# Patient Record
Sex: FEMALE | Race: WHITE | NOT HISPANIC OR LATINO
[De-identification: names, ages, dates, MRNs, and addresses within clinical notes are randomized per-mention and may not be internally consistent; named-entity substitution may affect disease eponyms.]

---

## 2017-09-12 PROBLEM — Z00.00 ENCOUNTER FOR PREVENTIVE HEALTH EXAMINATION: Status: ACTIVE | Noted: 2017-09-12

## 2017-09-25 ENCOUNTER — APPOINTMENT (OUTPATIENT)
Dept: VASCULAR SURGERY | Facility: CLINIC | Age: 82
End: 2017-09-25
Payer: MEDICARE

## 2017-09-25 VITALS — HEIGHT: 65 IN

## 2017-09-25 DIAGNOSIS — Z85.3 PERSONAL HISTORY OF MALIGNANT NEOPLASM OF BREAST: ICD-10-CM

## 2017-09-25 DIAGNOSIS — Z86.39 PERSONAL HISTORY OF OTHER ENDOCRINE, NUTRITIONAL AND METABOLIC DISEASE: ICD-10-CM

## 2017-09-25 DIAGNOSIS — J44.9 CHRONIC OBSTRUCTIVE PULMONARY DISEASE, UNSPECIFIED: ICD-10-CM

## 2017-09-25 DIAGNOSIS — Z82.61 FAMILY HISTORY OF ARTHRITIS: ICD-10-CM

## 2017-09-25 DIAGNOSIS — Z78.9 OTHER SPECIFIED HEALTH STATUS: ICD-10-CM

## 2017-09-25 DIAGNOSIS — Z87.39 PERSONAL HISTORY OF OTHER DISEASES OF THE MUSCULOSKELETAL SYSTEM AND CONNECTIVE TISSUE: ICD-10-CM

## 2017-09-25 DIAGNOSIS — Z72.3 LACK OF PHYSICAL EXERCISE: ICD-10-CM

## 2017-09-25 DIAGNOSIS — Z87.891 PERSONAL HISTORY OF NICOTINE DEPENDENCE: ICD-10-CM

## 2017-09-25 DIAGNOSIS — Z80.9 FAMILY HISTORY OF MALIGNANT NEOPLASM, UNSPECIFIED: ICD-10-CM

## 2017-09-25 DIAGNOSIS — M79.669 PAIN IN UNSPECIFIED LOWER LEG: ICD-10-CM

## 2017-09-25 DIAGNOSIS — Z87.09 PERSONAL HISTORY OF OTHER DISEASES OF THE RESPIRATORY SYSTEM: ICD-10-CM

## 2017-09-25 PROCEDURE — 93970 EXTREMITY STUDY: CPT

## 2017-09-25 PROCEDURE — 99204 OFFICE O/P NEW MOD 45 MIN: CPT

## 2018-12-20 ENCOUNTER — APPOINTMENT (OUTPATIENT)
Dept: PULMONOLOGY | Facility: CLINIC | Age: 83
End: 2018-12-20

## 2018-12-21 ENCOUNTER — APPOINTMENT (OUTPATIENT)
Dept: NEUROLOGY | Facility: CLINIC | Age: 83
End: 2018-12-21
Payer: MEDICARE

## 2018-12-21 DIAGNOSIS — M54.9 DORSALGIA, UNSPECIFIED: ICD-10-CM

## 2018-12-21 PROCEDURE — 99204 OFFICE O/P NEW MOD 45 MIN: CPT

## 2018-12-21 RX ORDER — PAROXETINE HYDROCHLORIDE 20 MG/1
20 TABLET, FILM COATED ORAL
Refills: 0 | Status: DISCONTINUED | COMMUNITY
End: 2018-12-21

## 2018-12-21 RX ORDER — LEVOTHYROXINE SODIUM 112 UG/1
112 TABLET ORAL
Refills: 0 | Status: DISCONTINUED | COMMUNITY
End: 2018-12-21

## 2018-12-21 RX ORDER — FLUTICASONE FUROATE 200 UG/1
200 POWDER RESPIRATORY (INHALATION)
Refills: 0 | Status: DISCONTINUED | COMMUNITY
End: 2018-12-21

## 2018-12-21 RX ORDER — PAROXETINE HYDROCHLORIDE 20 MG/1
20 TABLET, FILM COATED ORAL 3 TIMES DAILY
Refills: 0 | Status: ACTIVE | COMMUNITY

## 2018-12-21 RX ORDER — HYDROCODONE BITARTRATE AND ACETAMINOPHEN 7.5; 325 MG/1; MG/1
7.5-325 TABLET ORAL DAILY
Refills: 0 | Status: ACTIVE | COMMUNITY

## 2018-12-21 RX ORDER — PANTOPRAZOLE 40 MG/1
40 TABLET, DELAYED RELEASE ORAL
Refills: 0 | Status: DISCONTINUED | COMMUNITY
End: 2018-12-21

## 2018-12-21 RX ORDER — FLUTICASONE PROPIONATE 50 UG/1
50 SPRAY, METERED NASAL TWICE DAILY
Refills: 0 | Status: ACTIVE | COMMUNITY

## 2018-12-21 RX ORDER — PANTOPRAZOLE 40 MG/1
40 TABLET, DELAYED RELEASE ORAL DAILY
Refills: 0 | Status: ACTIVE | COMMUNITY

## 2018-12-21 RX ORDER — FLUTICASONE FUROATE AND VILANTEROL TRIFENATATE 50; 25 UG/1; UG/1
POWDER RESPIRATORY (INHALATION)
Refills: 0 | Status: ACTIVE | COMMUNITY

## 2018-12-21 RX ORDER — PREDNISONE 50 MG/1
TABLET ORAL
Refills: 0 | Status: ACTIVE | COMMUNITY

## 2018-12-21 RX ORDER — GABAPENTIN 600 MG/1
600 TABLET, COATED ORAL 3 TIMES DAILY
Refills: 0 | Status: ACTIVE | COMMUNITY

## 2018-12-21 RX ORDER — ACETAMINOPHEN 325 MG/1
325 TABLET ORAL
Refills: 0 | Status: DISCONTINUED | COMMUNITY
End: 2018-12-21

## 2018-12-21 RX ORDER — LEVOTHYROXINE SODIUM 100 UG/1
100 TABLET ORAL DAILY
Refills: 0 | Status: ACTIVE | COMMUNITY

## 2018-12-24 ENCOUNTER — APPOINTMENT (OUTPATIENT)
Dept: PULMONOLOGY | Facility: CLINIC | Age: 83
End: 2018-12-24

## 2018-12-24 VITALS — DIASTOLIC BLOOD PRESSURE: 68 MMHG | HEART RATE: 72 BPM | SYSTOLIC BLOOD PRESSURE: 124 MMHG | TEMPERATURE: 97.9 F

## 2018-12-24 VITALS — WEIGHT: 212 LBS | HEIGHT: 65 IN | BODY MASS INDEX: 35.32 KG/M2

## 2019-01-30 ENCOUNTER — RESULT REVIEW (OUTPATIENT)
Age: 84
End: 2019-01-30

## 2019-02-06 ENCOUNTER — APPOINTMENT (OUTPATIENT)
Dept: NEUROLOGY | Facility: CLINIC | Age: 84
End: 2019-02-06
Payer: MEDICARE

## 2019-02-06 VITALS
HEART RATE: 82 BPM | BODY MASS INDEX: 35.32 KG/M2 | HEIGHT: 65 IN | TEMPERATURE: 96.7 F | SYSTOLIC BLOOD PRESSURE: 146 MMHG | DIASTOLIC BLOOD PRESSURE: 80 MMHG | WEIGHT: 212 LBS

## 2019-02-06 PROCEDURE — 99214 OFFICE O/P EST MOD 30 MIN: CPT

## 2019-02-06 RX ORDER — PREGABALIN 100 MG/1
100 CAPSULE ORAL
Qty: 180 | Refills: 5 | Status: ACTIVE | COMMUNITY
Start: 2019-02-06 | End: 1900-01-01

## 2019-02-07 NOTE — PHYSICAL EXAM
[FreeTextEntry1] : Physical examination \par General: No acute distress, Awake, Alert.   \par \par Mental status \par Awake, alert, appropriate.  Gives detailed history.\par \par Cranial Nerves \par III, IV, VI: PERRL, EOMI - exotropia   \par VII: Facial strength is normal B/L. \par VIII:Decreased hearing, bilaterally. \par \par \par Motor exam  \par Muscle tone - no evidence of rigidity or resistance in all 4 extremities.  \par No atrophy or fasciculations \par Muscle Strength: arms and legs, proximal and distal flexors and extensors are normal except for left EHL 4/5.\par \par Reflexes \par All present, normal, and symmetrical.   \par Plantars right: mute.   \par Plantars left: mute.   \par \par Ankle reflexes 1+\par \par \par Sensory \par Reduced distal sensation to cold to half way up the lower legs.\par decreased vibration to the right knee and left great toe.\par \par \par Gait \par Slow, wide based gait. \par \par

## 2019-02-07 NOTE — DATA REVIEWED
[de-identified] : May 2018 \par Hemoglobin A1c-5.8%\par Immunofixation electrophoresis-no monoclonal banding\par ESR 12\par September 2018\par TSH normal\par

## 2019-02-07 NOTE — ASSESSMENT
[FreeTextEntry1] : Ms. Tamez is an 85-year-old woman with a painful distal symmetric polyneuropathy. Serological workup to identify any underlying cause of the neuropathy has been performed. \par Her pain is not well controlled on the current medication, therefore, I recommend tapering off the gabapentin and giving a trial of Lyrica.\par Gabapentin 600 mg 3 times a day for 2 days then 300 mg 3 times a day for 2 days then 300 mg twice a day for 2 days then stop.\par Start Lyrica when you are taking gabapentin 300 mg twice a day.\par 100 mg twice a day for one week then\par 200 mg twice a day for one week then\par 300 mg twice a day.\par Watch for increase in pain while tapering off the gabapentin. \par I do not recommend performing an EMG nerve conduction studies because the results of the nerve conduction studies will be suboptimal due to technical factors related to body habitus and lower extremity edema.

## 2019-02-07 NOTE — REVIEW OF SYSTEMS
[Negative] : Heme/Lymph [Feeling Poorly] : feeling poorly [Feeling Tired] : feeling tired [Recent Weight Gain (___ Lbs)] : recent [unfilled] ~Ulb weight gain [Leg Weakness] : leg weakness [Numbness] : numbness [Tingling] : tingling [Dizziness] : dizziness [Lightheadedness] : lightheadedness [Difficulty Walking] : difficulty walking [Anxiety] : anxiety [Depression] : depression [Nasal Discharge] : nasal discharge [Sore Throat] : sore throat [Hoarseness] : hoarseness [Lower Ext Edema] : lower extremity edema [Shortness Of Breath] : shortness of breath [Cough] : cough [SOB on Exertion] : shortness of breath during exertion [Heartburn] : heartburn [Joint Pain] : joint pain [Joint Stiffness] : joint stiffness [Limb Pain] : limb pain [Limb Swelling] : limb swelling [Breast Lump] : breast lump [Hot Flashes] : hot flashes [Muscle Weakness] : muscle weakness [FreeTextEntry2] : hypertension [de-identified] : neuropathy,pain in the legs and feet [FreeTextEntry9] : back pain [de-identified] : Temperature intolerance

## 2019-02-07 NOTE — REASON FOR VISIT
[Procedure: _________] : a [unfilled] procedure visit [FreeTextEntry1] : Pain in the legs and feet,does not feel better since last visit

## 2019-02-25 ENCOUNTER — RX CHANGE (OUTPATIENT)
Age: 84
End: 2019-02-25

## 2019-02-27 ENCOUNTER — APPOINTMENT (OUTPATIENT)
Dept: PAIN MANAGEMENT | Facility: CLINIC | Age: 84
End: 2019-02-27

## 2019-02-27 DIAGNOSIS — I10 ESSENTIAL (PRIMARY) HYPERTENSION: ICD-10-CM

## 2019-02-27 RX ORDER — LEVOTHYROXINE SODIUM 0.07 MG/1
75 TABLET ORAL
Refills: 0 | Status: ACTIVE | COMMUNITY

## 2019-02-27 RX ORDER — BUDESONIDE AND FORMOTEROL FUMARATE DIHYDRATE 160; 4.5 UG/1; UG/1
160-4.5 AEROSOL RESPIRATORY (INHALATION)
Refills: 0 | Status: ACTIVE | COMMUNITY

## 2019-02-27 RX ORDER — AZELASTINE HYDROCHLORIDE 137 UG/1
0.1 SPRAY, METERED NASAL
Refills: 0 | Status: ACTIVE | COMMUNITY

## 2019-02-27 RX ORDER — LOSARTAN POTASSIUM AND HYDROCHLOROTHIAZIDE 12.5; 5 MG/1; MG/1
50-12.5 TABLET ORAL
Refills: 0 | Status: ACTIVE | COMMUNITY

## 2019-02-27 RX ORDER — PAROXETINE HYDROCHLORIDE 20 MG/1
20 TABLET, FILM COATED ORAL
Refills: 0 | Status: ACTIVE | COMMUNITY

## 2019-02-27 RX ORDER — TIOTROPIUM BROMIDE 18 UG/1
18 CAPSULE ORAL; RESPIRATORY (INHALATION)
Refills: 0 | Status: ACTIVE | COMMUNITY

## 2019-03-08 ENCOUNTER — RECORD ABSTRACTING (OUTPATIENT)
Age: 84
End: 2019-03-08

## 2019-03-18 ENCOUNTER — APPOINTMENT (OUTPATIENT)
Dept: NEUROLOGY | Facility: CLINIC | Age: 84
End: 2019-03-18
Payer: MEDICARE

## 2019-03-18 ENCOUNTER — APPOINTMENT (OUTPATIENT)
Dept: PAIN MANAGEMENT | Facility: CLINIC | Age: 84
End: 2019-03-18
Payer: MEDICARE

## 2019-03-18 VITALS
SYSTOLIC BLOOD PRESSURE: 136 MMHG | WEIGHT: 212 LBS | HEIGHT: 65 IN | BODY MASS INDEX: 35.32 KG/M2 | DIASTOLIC BLOOD PRESSURE: 80 MMHG

## 2019-03-18 VITALS
DIASTOLIC BLOOD PRESSURE: 75 MMHG | HEIGHT: 65 IN | SYSTOLIC BLOOD PRESSURE: 137 MMHG | TEMPERATURE: 97 F | HEART RATE: 85 BPM | BODY MASS INDEX: 35.32 KG/M2 | WEIGHT: 212 LBS

## 2019-03-18 DIAGNOSIS — M54.16 RADICULOPATHY, LUMBAR REGION: ICD-10-CM

## 2019-03-18 DIAGNOSIS — M47.817 SPONDYLOSIS W/OUT MYELOPATHY OR RADICULOPATHY, LUMBOSACRAL REGION: ICD-10-CM

## 2019-03-18 DIAGNOSIS — G60.8 OTHER HEREDITARY AND IDIOPATHIC NEUROPATHIES: ICD-10-CM

## 2019-03-18 PROCEDURE — 99214 OFFICE O/P EST MOD 30 MIN: CPT

## 2019-03-18 RX ORDER — GABAPENTIN 600 MG/1
600 TABLET, COATED ORAL 3 TIMES DAILY
Qty: 180 | Refills: 5 | Status: ACTIVE | COMMUNITY
Start: 2019-03-18 | End: 1900-01-01

## 2019-03-18 RX ORDER — DULOXETINE HYDROCHLORIDE 60 MG/1
60 CAPSULE, DELAYED RELEASE PELLETS ORAL DAILY
Qty: 30 | Refills: 5 | Status: ACTIVE | COMMUNITY
Start: 2019-03-18 | End: 1900-01-01

## 2019-03-18 NOTE — PHYSICAL EXAM
[de-identified] : Constitutional: Well-developed, in no acute distress\par Eyes: Pupil- Right: normal, Left: normal\par Neck exam: Inspection normal\par Respiratory: Normal effort, speaking in full sentences\par Cardiovascular: Regular rate and rhythm\par Vascular: Dorsal pedis pulses normal and equal bilaterally\par Abdomen: Inspection normal, no distension\par Skin: Inspection normal, no bruising noted\par Musculoskeletal:\par Lumbar Spine:   Gait: Antalgic\par 		Inspection: Normal curvature, no abnormal kyphosis or scoliosis\par 		Facet loading: pain bilaterally\par 		Palpation:\par 			Lumbar and paraspinal muscles: pain bilaterally\par 			Sacroiliac joint: no pain\par 			Greater trochanter: no pain\par 		Muscle Strength:\par 		Iliopsoas: 5/5 bilaterally\par 		Quadriceps: 5/5 bilaterally\par 		Hamstrings: 5/5 bilaterally\par 		Tibialis anterior: 5/5 bilaterally\par 		Extensor hallucis longus: 5/5 bilaterally\par \par 		Sensation: normal and equal in bilateral lower extremities\par \par 		Reflexes:\par 			Patellar reflex: 2+ bilaterally\par 			Ankle jerk reflex: 2+ bilaterally\par 		\par 		Straight leg raise: negative bilaterally\par \par Extremity: no edema noted\par Neurological: Memory normal, AAO x 3, Cranial nerves II - XII grossly normal\par Psychiatric: Appropriate mood and affect, oriented to time, place, person, and situation

## 2019-03-18 NOTE — ASSESSMENT
[FreeTextEntry1] : 85-year-old female presents with long-standing low back pain that radiates down the bilateral lower extremities. She has had no relief with physical therapy, chiropractic care, or medication management. Burning in her legs has worsened. She has not yet had relief with Cymbalta. Gabapentin does help with her pain. She reports she is not interested in any interventions for her pain. I do believe the patient would likely benefit from an epidural steroid injection, she will consider this in the future. The patient advised please followup at any time she desires further evaluation.

## 2019-03-18 NOTE — HISTORY OF PRESENT ILLNESS
[FreeTextEntry1] : F/U for Ms. Tamez who is an 85-year-old woman with a painful distal symmetric polyneuropathy.  \par Her pain worsened when tapering off the gabapentin. The pain improved once again after restarting gabapentin. She is currently on 900 mg t.i.d. with no side effects. She still has some neuropathic pain which has improved.  She still has episodes in which her feet get red and she has a severe painful burning sensation in the feet. She has lower extremity edema.  She has significant skin changes in the legs. The neuropathic pain is worse in the evening.\par Lyrica did not help the pain.\par She has been taking duloxetine 30 mg daily for the past few weeks with no clear change in pain.  No side effects.\par \par She has had chronic low back pain for decades

## 2019-03-18 NOTE — DATA REVIEWED
[de-identified] : Vitamin B12 - 667\par \par May 2018 \par Hemoglobin A1c-5.8%\par Immunofixation electrophoresis-no monoclonal banding\par ESR 12\par September 2018\par TSH normal\par

## 2019-03-18 NOTE — REVIEW OF SYSTEMS
[Feeling Poorly] : feeling poorly [Feeling Tired] : feeling tired [Recent Weight Gain (___ Lbs)] : recent [unfilled] ~Ulb weight gain [Numbness] : numbness [Leg Weakness] : leg weakness [Dizziness] : dizziness [Tingling] : tingling [Lightheadedness] : lightheadedness [Anxiety] : anxiety [Difficulty Walking] : difficulty walking [Depression] : depression [Nasal Discharge] : nasal discharge [Sore Throat] : sore throat [Hoarseness] : hoarseness [Lower Ext Edema] : lower extremity edema [Shortness Of Breath] : shortness of breath [Cough] : cough [Heartburn] : heartburn [SOB on Exertion] : shortness of breath during exertion [Joint Stiffness] : joint stiffness [Joint Pain] : joint pain [Limb Pain] : limb pain [Limb Swelling] : limb swelling [Breast Lump] : breast lump [Hot Flashes] : hot flashes [Muscle Weakness] : muscle weakness [Negative] : Heme/Lymph [FreeTextEntry2] : hypertension [de-identified] : neuropathy,pain in the legs and feet [FreeTextEntry9] : back pain [de-identified] : Temperature intolerance

## 2019-03-18 NOTE — CONSULT LETTER
[Dear  ___] : Dear  [unfilled], [FreeTextEntry1] : I had the pleasure of evaluating your patient, PAUL DAVIS. Please see the assessment section below for a summery of my diagnostic impression and plan.\par \par Thank you very much for allowing me to participate in the care of this patient. If you have any questions, please do not hesitate to contact me. \par \par Sincerely,\par \par Stephanie Wang MD\par

## 2019-03-18 NOTE — ASSESSMENT
[FreeTextEntry1] : Ms. Tamez is an 85-year-old woman with a painful distal symmetric polyneuropathy. Serological workup to identify any underlying cause of the neuropathy has been performed. \par Mild to moderate lumbar spinal stenosis - I reviewed MRI LS spine from several years ago. \par Her pain worsened after tapering gabapentin and improved after restarting it.  Continue to increase dose from 900 to 1200 mg TID.\par Lyrica did not help her pain. \par She started duloxetine 30 mg daily a few weeks ago - increase to 60 mg after completing one full month of 30 mg daily. \par \par \par \par I do not recommend performing an EMG nerve conduction studies because the results of the nerve conduction studies will be suboptimal due to technical factors related to body habitus and lower extremity edema.

## 2019-03-18 NOTE — HISTORY OF PRESENT ILLNESS
[FreeTextEntry1] : As per my note dated 11/14/18: "84-year-old female presents with long-standing low back pain. She reports that her back pain radiates down her legs. She has had no relief with physical therapy, chiropractic care, or medication management. She is maintained on opioid therapy. She had no relief with medical marijuana. She has a history of breast cancer. She reports that she gets very warm and red at sites of all previous surgeries. She has started gabapentin which has been beneficial, although she does have some lethargy. Pain is sharp and stabbing. Pain is severe and 10 of 10 in intensity at times. Quality of life is significantly impaired. Rest improves her pain. Pain is worse with activity."\par \par Pt returns for follow-up today, 03/18/19. Dr. Ramírez has weaned off of her Lyrica and is uptitrating gabapentin. She reports that her pain became severe this week  radiating from her feet up her legs. Pain is 10 out 10 in intensity. Last week she developed an ifection and is currently on antibiotics. She has had diarrhea associated with this. She is not interested in any interventions at this time. Pain is 10 out of 10 in intensity. Pain is burning. It is worse activity. Pain improves with rest. No other recent changes in health.\par

## 2019-03-19 ENCOUNTER — APPOINTMENT (OUTPATIENT)
Dept: NEUROLOGY | Facility: CLINIC | Age: 84
End: 2019-03-19

## 2019-04-15 ENCOUNTER — APPOINTMENT (OUTPATIENT)
Dept: VASCULAR SURGERY | Facility: CLINIC | Age: 84
End: 2019-04-15
Payer: MEDICARE

## 2019-04-15 DIAGNOSIS — R20.8 OTHER DISTURBANCES OF SKIN SENSATION: ICD-10-CM

## 2019-04-15 DIAGNOSIS — M71.20 SYNOVIAL CYST OF POPLITEAL SPACE [BAKER], UNSPECIFIED KNEE: ICD-10-CM

## 2019-04-15 DIAGNOSIS — M79.606 PAIN IN LEG, UNSPECIFIED: ICD-10-CM

## 2019-04-15 DIAGNOSIS — M79.89 OTHER SPECIFIED SOFT TISSUE DISORDERS: ICD-10-CM

## 2019-04-15 DIAGNOSIS — R29.898 OTHER SYMPTOMS AND SIGNS INVOLVING THE MUSCULOSKELETAL SYSTEM: ICD-10-CM

## 2019-04-15 PROCEDURE — 99214 OFFICE O/P EST MOD 30 MIN: CPT

## 2019-04-16 PROBLEM — R20.8 BURNING SENSATION OF LOWER EXTREMITY: Status: ACTIVE | Noted: 2017-09-25

## 2019-04-16 PROBLEM — M71.20 BAKERS CYST: Status: ACTIVE | Noted: 2019-04-15

## 2019-04-16 PROBLEM — M79.89 LEG SWELLING: Status: ACTIVE | Noted: 2017-09-25

## 2019-04-16 PROBLEM — M79.606 LEG PAIN: Status: ACTIVE | Noted: 2018-12-21

## 2019-04-16 PROBLEM — R29.898 LEG HEAVINESS: Status: ACTIVE | Noted: 2017-09-25

## 2019-04-16 NOTE — REVIEW OF SYSTEMS
[Fever] : no fever [Leg Claudication] : intermittent leg claudication [Lower Ext Edema] : lower extremity edema [Limb Pain] : limb pain [Limb Swelling] : limb swelling

## 2019-04-16 NOTE — PHYSICAL EXAM
[JVD] : no jugular venous distention  [Normal Breath Sounds] : Normal breath sounds [Normal Rate and Rhythm] : normal rate and rhythm [2+] : left 2+ [Ankle Swelling (On Exam)] : present [Ankle Swelling Bilaterally] : bilaterally  [Ankle Swelling On The Left] : moderate [Alert] : alert [Oriented to Person] : oriented to person [Oriented to Place] : oriented to place [Oriented to Time] : oriented to time [de-identified] : Awake and Alert [de-identified] : bl feet pink warm and well perfused [de-identified] : appropriate

## 2019-04-16 NOTE — ASSESSMENT
[FreeTextEntry1] : The patient has no evidence of significant peripheral arterial disease on physical exam. i think her leg pain is accounted for by her spinal disease. I do not think there is an indication for further vascular intervention.\par \par Edema - consider wearing compression stockings daily, leg elevation\par \par Follow up on a PRN basis.

## 2019-04-16 NOTE — HISTORY OF PRESENT ILLNESS
[FreeTextEntry1] : 84 yo female previously seen by me for edema of the lower extremities returns for a chief complaint of leg pain. The patient reports that she developed pain in her right lower extremity and underwent a venous duplex. She was found to have a Baker's cyst and was seen by an orthopedist. During her orthopedic evaluation she underwent an xray and was found to have vascular calcifications. She was sent for further vascular evaluation. She reports severe pain with ambulation. She has known neurogenic claudication.

## 2019-06-18 ENCOUNTER — APPOINTMENT (OUTPATIENT)
Dept: NEUROLOGY | Facility: CLINIC | Age: 84
End: 2019-06-18

## 2020-09-29 NOTE — HISTORY OF PRESENT ILLNESS
Jose Ba is a 77 FreeTextEntry1] : F/U for Ms. Tamez who is an 85-year-old woman with a painful distal symmetric polyneuropathy.  She has had no relief of this neuropathic pain with gabapentin 1200 TID with no side effects.  She has been on Cymbalta in the past without relief.  She complains of having episodes in which her feet get red and she has a severe painful burning sensation in the feet. She has lower extremity edema which has improved recently. She has significant skin changes in the legs. The neuropathic pain is worse in the evening.\par \par She has had chronic low back pain for decades Mary Gibson, APRN - CNP

## 2021-10-06 PROBLEM — I10 ESSENTIAL HYPERTENSION: Status: ACTIVE | Noted: 2019-02-27

## 2024-05-27 ENCOUNTER — TRANSCRIPTION ENCOUNTER (OUTPATIENT)
Age: 89
End: 2024-05-27

## 2024-07-06 ENCOUNTER — TRANSCRIPTION ENCOUNTER (OUTPATIENT)
Age: 89
End: 2024-07-06